# Patient Record
Sex: MALE
[De-identification: names, ages, dates, MRNs, and addresses within clinical notes are randomized per-mention and may not be internally consistent; named-entity substitution may affect disease eponyms.]

---

## 2022-09-08 PROBLEM — Z00.00 ENCOUNTER FOR PREVENTIVE HEALTH EXAMINATION: Status: ACTIVE | Noted: 2022-09-08

## 2022-09-09 ENCOUNTER — APPOINTMENT (OUTPATIENT)
Dept: NEUROSURGERY | Facility: CLINIC | Age: 62
End: 2022-09-09

## 2022-09-09 DIAGNOSIS — G89.29 DORSALGIA, UNSPECIFIED: ICD-10-CM

## 2022-09-09 DIAGNOSIS — M54.9 DORSALGIA, UNSPECIFIED: ICD-10-CM

## 2022-09-09 PROCEDURE — 99204 OFFICE O/P NEW MOD 45 MIN: CPT

## 2022-09-09 NOTE — REASON FOR VISIT
[New Patient Visit] : a new patient visit [FreeTextEntry1] : PT here with complaints of lower back pain.

## 2022-09-09 NOTE — PHYSICAL EXAM
[Able to toe walk] : the patient was able to toe walk [Able to heel walk] : the patient was able to heel walk [Intact] : all sensory within normal limits bilaterally

## 2022-09-09 NOTE — HISTORY OF PRESENT ILLNESS
[de-identified] : \chris Carter is a pleasant 62-year-old gentleman here with complaints of axial back pain.  He had x-rays and CAT scan and finally got an MRI performed recently for my review.  He has really no symptoms of neurogenic claudication or radiculopathy.  He has mostly axial back pain.  He has a medical history of atrial fibrillation treated with an ablation.  He had a stroke in 2020 treated endovascularly.  He is taking Eliquis.  He has not done any procedures for the spine at this point and is here for a initial evaluation.  He can really only take Tylenol because of the Eliquis and has not been on any NSAIDs.\par \par \par Fiorellla\par CVA  2020\par afib   s/p ablation \par Eliquis\par [facility] \par \par Brayden Aguiar\par [place]

## 2024-05-07 NOTE — PLAN
Patient Specific Counseling (Will Not Stick From Patient To Patient): - Discussed prescription Urea cream (compound from [facility]) vs. continue to manage with OTC topical keratolytics.  He would like to continue with OTC topicals. He will call to request prescription if he would like for further management.
[FreeTextEntry1] : DIAGNOSIS:  DISK DEGENERATION  \par TREATMENT PLAN:  NON-SURGICAL\par \par This is a patient with a degenerated lumbar disk.  I have recommended nonsurgical management at this time.  This consists of physical therapy and/or manual medicine in conjunction to medical therapy and other conservative methods.  These include the consideration of trigger point injections and or the utilization of modalities such as TENS where applicable.  The next tier would be the referral to a pain management specialist (anesthesia or physiatry) for the consideration of spinal injections.  This includes the options of epidural steroids, facet injections as well as other novel techniques that may provide pain relief.  Although there is indeed evidence of disk degeneration on imaging, discectomy or spinal fusion for the sole purposes of treating back/leg pain in the absence of a compressive lesion or neurological issue is associated with poor outcomes.   \par \par I have reviewed the images in detail with the patient today in my office and have answered all questions regarding this condition to the best of my ability to the patient’s satisfaction.\par 
Detail Level: Zone